# Patient Record
Sex: MALE | Race: WHITE | Employment: FULL TIME | URBAN - METROPOLITAN AREA
[De-identification: names, ages, dates, MRNs, and addresses within clinical notes are randomized per-mention and may not be internally consistent; named-entity substitution may affect disease eponyms.]

---

## 2018-02-06 ENCOUNTER — DOCUMENTATION ONLY (OUTPATIENT)
Dept: ORTHOPEDIC SURGERY | Age: 53
End: 2018-02-06

## 2018-02-06 ENCOUNTER — OFFICE VISIT (OUTPATIENT)
Dept: ORTHOPEDIC SURGERY | Age: 53
End: 2018-02-06

## 2018-02-06 VITALS
HEART RATE: 70 BPM | WEIGHT: 241.6 LBS | TEMPERATURE: 98.3 F | SYSTOLIC BLOOD PRESSURE: 157 MMHG | OXYGEN SATURATION: 94 % | RESPIRATION RATE: 19 BRPM | DIASTOLIC BLOOD PRESSURE: 90 MMHG | HEIGHT: 68 IN | BODY MASS INDEX: 36.62 KG/M2

## 2018-02-06 DIAGNOSIS — M51.26 PROTRUSION OF LUMBAR INTERVERTEBRAL DISC: Primary | ICD-10-CM

## 2018-02-06 PROBLEM — M48.062 SPINAL STENOSIS OF LUMBAR REGION WITH NEUROGENIC CLAUDICATION: Status: ACTIVE | Noted: 2018-02-06

## 2018-02-06 NOTE — PROGRESS NOTES
[address]  [address], [address]  [facility], [address]  Phone: [phone]  Fax: [phone]  INITIAL CONSULTATION  Patient: Sy Anglin                MRN: [id number]       SSN: [id number]  YOB: 1965        AGE: 47 y.o. SEX: male  Body mass index is 36.74 kg/(m^2). PCP: Nisha Badillo MD  02/06/18    Chief Complaint   Patient presents with    Back Pain     AHSAN          HISTORY OF PRESENT ILLNESS, RADIOGRAPHS, and PLAN:         HISTORY OF PRESENT ILLNESS:  Mr. Griffin De La Torre is seen today. He is accompanied by his wife and his . Mr. Griffin De La Torre is a 50-year-old male who presents himself as relatively healthy whose chief complaint currently is one of low back pain. His main issue is the pain is bilateral in his low back and centered at the superior aspect of his iliac crest bilaterally in his flank region. It is not in the midline. He denies regular leg pain. He gets an area of numbness in the left leg laterally to the knee from the hip joint to just below the knee on the left. He has had two episodes of right-sided leg weakness where he says the leg has gone out on him. He denies any other leg pain until he was actually in my waiting room leaving the office where he did say he had some grabbing pain in the top half of the right leg, which caused him to limp for a few minutes. He then recounted that he gets grabbing pains, which he describes as frogs, in both his right and left legs that he does not really recount because they are second nature to him that he gets bilaterally on occasion. He describes the back pain as between a 6/10 and an 8/10. It is most of the time. It is quite disconcerting and painful to him. This pain started in June 2016 after he injured himself on a treadmill while in [place]. It was a low-energy injury.   He has been through numerous treatments recounted in his medical records including extensive physical therapy, injections, medications, and attempted radio frequency ablation. He does state that he has some upper extremity symptoms but does not focus on them. He has put on weight since his injury, as he is less active. He says that standing, lying, and walking make his symptoms better. Changing positions, coughing, sneezing, bending forward, lifting, and sitting all worsen his symptoms. He does not smoke. He occasionally drinks beer. He is a  for [employer]. I reviewed his medical records and treatment and evaluations by Dr. Rui Turner and Dr. Randall Null, as well as an evaluation by Dr. Isadora Lefort. I also reviewed personally MRI and CT scan studies done of the patients lumbar spine. I also reviewed a CT discography done. The patient describes his pain as pervasive in his life. He is not terribly clear on his expectations from treatment but wants to be normal and playing golf. He describes himself as being a very high-functioning person prior to his injury, very physical, in the best shape of his life lifting high amounts of weight on a regular basis, doing all ranges of high level physical activities and now feels quite disabled because of this primarily axial back pain. He wants to be fixed and get better. PHYSICAL EXAMINATION:  His physical exam demonstrates normal strength in his lower extremities. There are no nerve tension signs. He has nonspecific numbness in the lateral aspect of his left leg, normal right leg. His main symptoms, mechanical symptoms, are entirely in his back. They are load intolerant. He is overweight. His weight is 241 pounds. He states he has put on significant weight since his injury. RADIOGRAPHS:  CT scan of his lumbar spine demonstrates degenerative changes at L4-5 and L5-S1 with disc space collapse, facet arthropathy, foraminal stenosis at L4-5 and L5-S1, and no evidence of instability. Degenerative changes in his discs are evident on discography. MRI recently done of his lumbar spine demonstrates a moderate sized, right paracentral disc herniation at L5-S1 causing lateral recess stenosis, a smaller, right, paracentral disc protrusion at L4-5, and mild degenerative changes at L4-5 causing mild central stenosis with an essentially age-normal lumbar spine at that segment. There is essentially minimal facet effusion. There is no evidence of instability or listhesis. EMGs done December 1, 2017, were abnormal studies, low-amplitude peroneal motor response, no acute denervation or radicular changes, and possible chronic L5-S1 radiculopathy. The patient has had medial branch blocks, which were of moderate effectiveness but ended up having RFA in his low lumbar segments that evidently were ineffective at addressing his pain. The patient has been through aggressive physical therapy. I reviewed an opinion of Dr. Pretty Will of September 20, 2017. Dr. Emely Canada, in his opinion, demonstrated degenerative disc disease at L4-5 and L5-S1 with three levels of painful discs at L3-4, L4-5, and L5-S1. He recommended conservative care with pain management with no surgery. At that time, in September 2017, the patients weight was 233, as opposed to his current 241. There does not appear to be any change in his physical exam at that time. Also, it was primarily back pain and occasional complaints of leg symptoms. I reviewed records from Dr. Kahlil Griffith. Dr. Asad Holliday describes in his report onset of radiculopathy, which I assume means leg pain, back into his legs, right greater than left at times and vice versa. So, variable back and leg pain.   In his opinion, the CT myelogram demonstrates truncation of the nerve roots at L3-4, disc extrusion at L5-S1 on the right, with triangulation of the thecal sac at L4-5 with moderate stenosis with a recommendation of a laminectomy at L3, L4, and L5.      ASSESSMENT/PLAN: We have a patient who has had a series of injuries while [place], one on a treadmill and another while lifting ammo boxes with resultant primarily back pain. His history of leg pain varies. Certainly, he was adamant during the bulk of our meeting that there was not much in the way of leg pain, though on leaving the office, he had some statements about leg symptoms. Certainly, the primary symptom the patient is having and was rather adamant while talking to him in my office, was one of back pain. That appears to have also been the primary complaint while visiting the initial treating physician, Dr. Yanely Major. The leg symptoms he gets are primarily numbness, and the symptoms vary from side to side. His EMGs are essentially benign without fixed radiculopathy. I am unimpressed by the global nature of his stenosis at any segment, and while the patient does have a right-sided disc herniation at L5-S1, he has no fixed numbness, weakness, or nerve tension signs on the right and is not suffering from right-sided sciatica. He has primarily back pain. Dr. Yanely Major initially evaluated and treated him for mechanical back pain. He did have relief from medial branch blocks but had no relief from RFA. The relief from medial branch blocks would insinuate that at least part of his pain is coming from his posterior elements, that is degenerative change, and injury to the facets at L4-5 and L5-S1. In looking at this CT scan of his lumbar spine, that is not surprising. The nature of his low back pain, its load-intolerance, his sitting intolerance, his problems with forward bending are also consistent with discogenic low back pain. While I do not believe in its validity as a diagnostic test and its predictive value in terms of surgery, the discogram that was done on him would indicate he has a measure of discogenic low back pain.   Dr. Yanely Major felt he was not a surgical candidate in as much as L3-4 did not provide us a pain-free control. Again, I am not one who believes in the validity of discography, so I would choose to ignore the use of it as a diagnostic test in either direction. I believe this patient does have a measure of discogenic low back pain, and that discogenic low back pain by appearance of his MRIs and his CT scan, and his CT discography would be emanating from also his L4-5 and L5-S1 segments. That is consistent with his symptomatology. As of now, there is no accurate diagnostic study to define discogenic low back pain. It is a diagnosis based on history and experience. That stated in-hand, I believe this patients pain is emanating from the two bottom lumbar levels. His pain is stemming from degenerative change coming from both the facets and discs at those two bottom lumbar levels. He has no gross instability. His pain is not coming from nerve root compression. To the best we can define it, his pain is coming from mechanical irritability of both the facet and of the disc at L4-5 and L5-S1. I believe our experience in spine surgery would show the only intervention that may help him with his pain would be a fusion of the L4-5 and L5-S1 segments. In my experience and, I believe, the literature of operating on discogenic low back pain, would show that the success rate of such a fusion type surgery would be in the neighborhood of 60%. That is approximately 6 out of 10 people who would undergo such a two-level lumbar fusion would feel it was successful for them. That being said, I do not believe a decompressive surgery of a three-level laminectomy is indicated. I do not believe this patient is having significant radicular pain. In fact, he does not describe a primary radicular pain syndrome to me, and I do not have a particular indication for doing a three-level laminectomy on him.   In addition, while I believe if one felt obligated to address his pain surgically, a two-level fusion is the surgery that would be indicated, I would not recommend surgery for this patient. I believe the success rate for surgery in his circumstance is poor. I do not believe his expectations are reasonable. I believe at the age of 47, at a weight of 240 pounds, the odds of him having a successful two-level lumbar fusion and achieving his expectations of functionality are minimal, and while we may be able to achieve a radiographic success, a truly functional success in meeting his expectations is poor, and I would strongly recommend against consideration of any type of surgery in his circumstance. I believe the best outcome for him would be achieved through pain management techniques. This could be either a consideration of various modes of medication management or either a more aggressive pain management technique such as spinal cord stimulation should his pain truly be felt to be beyond acceptance. My first goal would be to improve his physical fitness and try to review his recent weight gain and improve his overall level of fitness and core strength and flexibility. While a fusion surgery could be considered, I think it is unlikely to meet his overall expectation of functionality and outcome, and I would recommend against it. I do not see any role for decompressive procedures in this patients circumstance. Other than that, I would say his pain needs to be managed, and procedures and interventions whose goal it is to ameliorate his pain are to be considered reasonable goals. I hope this assists you in the evaluation of this patients circumstance. Please contact me should you have further questions or concerns. History reviewed. No pertinent past medical history.     Family History   Problem Relation Age of Onset    Cancer Mother     Cancer Father     Diabetes Father     Elevated Lipids Father     Parkinson's Disease Father     Diabetes Sister    Jenny Ingrid Bipolar Disorder Sister     Kidney Disease Sister     Other Sister      bursitis       Current Outpatient Prescriptions   Medication Sig Dispense Refill    meloxicam (MOBIC) 7.5 mg tablet Take 7.5 mg by mouth daily.  traMADol (ULTRAM) 50 mg tablet Take 50 mg by mouth every eight (8) hours as needed for Pain.  cyclobenzaprine (FLEXERIL) 10 mg tablet Take 10 mg by mouth three (3) times daily as needed for Muscle Spasm(s). No Known Allergies    Past Surgical History:   Procedure Laterality Date    HX APPENDECTOMY      HX CYST REMOVAL      cyst removal under chin    HX TONSILLECTOMY         History reviewed. No pertinent past medical history. Social History     Social History    Marital status:      Spouse name: N/A    Number of children: N/A    Years of education: N/A     Occupational History    Not on file. Social History Main Topics    Smoking status: Former Smoker     Quit date: 1/1/1996    Smokeless tobacco: Never Used    Alcohol use Yes      Comment: occassional    Drug use: No    Sexual activity: Yes     Other Topics Concern    Not on file     Social History Narrative    No narrative on file           REVIEW OF SYSTEMS:   CONSTITUTIONAL SYMPTOMS:  Negative. EYES:  Negative. EARS, NOSE, THROAT AND MOUTH:  Negative. CARDIOVASCULAR:  Negative. RESPIRATORY:  Negative. GENITOURINARY: Per HPI. GASTROINTESTINAL:  Per HPI. INTEGUMENTARY (SKIN AND/OR BREAST):  Negative. MUSCULOSKELETAL: Per HPI.   ENDOCRINE/RHEUMATOLOGIC:  Negative. NEUROLOGICAL:  Per HPI. HEMATOLOGIC/LYMPHATIC:  Negative. ALLERGIC/IMMUNOLOGIC:  Negative. PSYCHIATRIC:  Negative. PHYSICAL EXAMINATION:   Visit Vitals    /90    Pulse 70    Temp 98.3 °F (36.8 °C) (Oral)    Resp 19    Ht 5' 8\" (1.727 m)    Wt 241 lb 9.6 oz (109.6 kg)    SpO2 94%    BMI 36.74 kg/m2    PAIN SCALE: 6/10    CONSTITUTIONAL: The patient is in no apparent distress and is alert and oriented x 3. HEENT: Normocephalic. Hearing grossly intact. NECK: Supple and symmetric. no tenderness, or masses were felt. RESPIRATORY: No labored breathing. CARDIOVASCULAR: The carotid pulses were normal. Peripheral pulses were 2+. CHEST: Normal AP diameter and normal contour without any kyphoscoliosis. LYMPHATIC: No lymphadenopathy was appreciated in the neck, axillae or groin. SKIN:  Negative for scars, rashes, lesions, or ulcers on the right upper, right lower, left upper, left lower and trunk. NEUROLOGICAL: Alert and oriented x 3. Ambulation without assistive device. FWB. EXTREMITIES:  See musculoskeletal.  MUSCULOSKELETAL:   Head and Neck:  Negative for misalignment, asymmetry, crepitation, defects, tenderness masses or effusions.  Left Upper Extremity: Inspection, percussion and palpation preformed. Casillass sign is negative.  Right Upper Extremity: Inspection, percussion and palpation preformed. Casillass sign is negative.  Spine, Ribs and Pelvis: Antalgic ROM. Sharp pain in back, L iliac crest.  Inspection, percussion and palpation preformed. Negative for misalignment, asymmetry, crepitation, defects, tenderness masses or effusions.  Left Lower Extremity: Numbness lateral aspect. Inspection, percussion and palpation preformed. Negative straight leg raise.  Right Lower Extremity: Inspection, percussion and palpation preformed. Negative straight leg raise. SPINE EXAM:     Lumbar spine: No rash, ecchymosis, or gross obliquity. No focal atrophy is noted. ASSESSMENT    ICD-10-CM ICD-9-CM    1. Protrusion of lumbar intervertebral disc M51.26 722.10        Written by Monica Boland, as dictated by Supriya Gallegos MD.    I, Dr. Supriya Gallegos MD, confirm that all documentation is accurate.

## 2018-02-06 NOTE — PROGRESS NOTES
I CAME UPON PATIENT IN WAITING ROOM WITH NURSE  AND HIS WIFE.  THEY WERE TALKING AND I APPROACHED TO MAKE SURE EVERYTHING WAS OKAY. I WAS TOLD THAT IN HIS DISCUSSION WITH DR. Leyda Villareal HE WAS ASKED IF HE HAS ANY PAIN IN HIS LEG. HE HAD STATED THAT HE DOES NOT, BUT, WALKING OUT OF THE APPOINTMENT HE DID HAVE A GRABBING PAIN IN THE TOP HALF OF THE RT LEG THAT CAUSED HIM TO LIMP FOR A FEW MINUTES. THIS MADE HIM THINK ABOUT WHAT HE HAD TOLD DR. LORENZ. HE SAID HE GETS THESE GRABBING PAINS HE CALLED A \"FROG\" PERIODICALLY IN BOTH HIS LEFT AND RIGHT LEG. HE SAYS HE DID NOT THINK ABOUT IT BECAUSE IT IS LIKE SECOND NATURE TO HIM NOW. HIS NUMBNESS IS CONSTANT ON THE LEFT BUT THE GRABBING PAINS HAPPEN IN BOTH THE LEFT AND RIGHT. JUST A NOTE TO DR. LORENZ FROM THE PATIENT.   Ben Peres

## 2018-02-06 NOTE — PROGRESS NOTES
HISTORY OF PRESENT ILLNESS  Umberto Hansen is a 47 y.o. male.   HPI    ROS    Physical Exam    Ortho Exam    ASSESSMENT and PLAN  {ASSESSMENT/PLAN:[id number]}

## 2018-10-24 ENCOUNTER — DOCUMENTATION ONLY (OUTPATIENT)
Dept: ORTHOPEDIC SURGERY | Age: 53
End: 2018-10-24

## 2018-10-24 NOTE — PROGRESS NOTES
wc form was received from sathya, instructed patient that we will call when completed, may take 7-10 business days.

## 2021-08-26 ENCOUNTER — HOSPITAL ENCOUNTER (OUTPATIENT)
Dept: NUTRITION | Age: 56
Discharge: HOME | End: 2021-08-26
Payer: OTHER GOVERNMENT

## 2021-08-26 PROCEDURE — G0108 DIAB MANAGE TRN  PER INDIV: HCPCS

## 2021-08-26 NOTE — PROGRESS NOTES
[employer] [facility] Outpatient Diabetes Self-Management Education Program      DSME RECORD and PROGRESS NOTE         Patient: Gege Morton : 1965         Physician: Brenden Duran MD Date: 2021     VISIT No: [x]1 []2 []3 []4 []5 []6               [] Initial                   [] Subsequent Year ACTUAL hours furnished: PROGRESS NOTE    A1C: 6.6  Date A1C drawn: Aug 2021     [x] Initial     [] Updated    Height: 68  Weight: 208                Date: 21  BMI: 31.6     [x] Initial     [] Updated                   Weight Change:  [] Up    [] Down  lbs (Only for follow-up visit)     Diabetes Meds:  [x] None   [] List: (Provide list at initial visit -only list changes at follow-up visits)  [] No change       Other Meds:  [] BP   [] lipids          [] depression/anxiety        [] other:   Meloxicam prn  Triamcinolone Acetonide       Behavior Goals Set Related To:   (List at initial visit only and add any new goals, if any, from follow-up visits)  [x] Healthy eating      [] Being active  [] Taking meds        [x] Monitoring   [x] Reducing risks     [] Healthy coping  [x] Problem-solving  []  Other:    [] Separate Behavior Form Attached (use to assess progress on previously set goals)      [] Reviewed achievement of goals    Notes: Patient was recently diagnosed with DM this month after a short stay in the hospital for pancreatitis. Patient reports that he does have a family history of DM (father, sister). Patient reports that his diet before hospitalization was poor (fast-food and sweets). Patient's wife is present with him at visit today and very supportive. She does most grocery shopping and meal preparation.                     [x] Program schedule and visit dates                  [] 0.5     [x] 1    [] 1.5    [] 2     [] 2.5     [] 3    Format:   [x] Individual       [] Group    Also present:   [x]  Family: Wife     [] Friend:     [] Caregiver:        [] Other:                  [x] Behavior goals: [x] set goals  [] review achievement     [x] Prediabetes and Diabetes biology & diagnosis     [] BG goals and targets        [x] A1c test, meaning and goal     [x] Diabetes causes & risk factors        [x] Purpose of Healthy Eating & tips     [x] Body weight: [] healthy wt             [x] risks when overwt/obese     [x] Carbohydrate food sources & counting     [x] Lean proteins & healthy fats     [x] How foods/drinks affect BG     [] How to read a Food Label     [x] Healthy Plate Method     [x] Benefits of Being Active     [] Different types of exercise     [] Recommendations & safety issues for exercise     [] Diabetes identification (on body, in wallet/purse)     [] Obstacles to behavior change     [x] S.M.A.R.T.  Goals     [] Taking medications:      [] oral   []  injections   [] insulin     [] Understanding types of meds     [] Proper storage and handling of meds     [] OTC and alternative meds     [x] Purpose & benefits of Monitoring     [] Causes, prevention & treatment for hypo- & hyperglycemia     [x] General guidelines for monitoring     [] SMBG and monitoring schedule     [] BG logs       [] reviewed logs     [] Pattern management with SMBG log     [] BG meter receipt              Type/name of:                                    [] BG meter review     [] Reducing risks to prevent chronic complications       Monitor health status:             []  Eye exam    [] BP                        [x]  Lipids/heart disease            []  Nerve/Neuropathy             [] Foot care              []  Dental      [] Sleep                    []  Kidney      [] other:     [] Obstacles to behavior change      [x] Problem solving skills to overcome barriers and challenges     [] The process and tips for problem solving     [] Problem solving for hypo- & hyperglycemia     [] Sick Day Management     [x] Healthy Coping & stress management     [] DSM support plan      [x] Individual assessment     [x] Individual education plan and needs     [x]  Review initial assessment; correct knowledge deficits      []  Patient-selected topics:          []  Other topic(s):           Educator Signature: Tom Macario, 8/26/2021 11:53 AM

## 2022-01-05 ENCOUNTER — IMPORTED ENCOUNTER (OUTPATIENT)
Dept: URBAN - METROPOLITAN AREA CLINIC 1 | Facility: CLINIC | Age: 57
End: 2022-01-05

## 2022-01-05 PROBLEM — Z96.1: Noted: 2022-01-05

## 2022-01-05 PROBLEM — H35.371: Noted: 2022-01-05

## 2022-01-05 PROBLEM — H43.813: Noted: 2022-01-05

## 2022-01-05 PROBLEM — H43.811: Noted: 2022-01-05

## 2022-01-05 PROBLEM — R73.09: Noted: 2022-01-05

## 2022-01-05 PROCEDURE — 92004 COMPRE OPH EXAM NEW PT 1/>: CPT

## 2022-01-05 NOTE — PATIENT DISCUSSION
1.  DM Type II (Diet Controlled) without sign of diabetic retinopathy and no blot heme on dilated retinal examination today OU No Macular Edema:  Discussed the pathophysiology of diabetes and its effect on the eye and risk of blindness. Stressed the importance of strong glucose control. Advised of importance of at least yearly dilated examinations but to contact us immediately for any problems or concerns. 2. Pseudophakia OU (Toric MF) -- (VEC - 2019)  3. PVD w/o Tear OD -- RD precautions. 4.  Epiretinal Membrane OD -- Observe for change. Patient deferred Manifest Rx today. Return for an appointment in 1 year 27 with Dr. Sharmila Evangelista.

## 2022-01-05 NOTE — PATIENT DISCUSSION
H/o LASIK CHARY (VEC - 2019) Patient deferred Manifest Rx today. Return for an appointment in 1 year 27 with Dr. Maximino Schulz.

## 2022-04-02 ASSESSMENT — VISUAL ACUITY
OD_CC: 20/20
OS_CC: 20/20

## 2022-04-02 ASSESSMENT — TONOMETRY
OD_IOP_MMHG: 11
OS_IOP_MMHG: 11

## 2023-01-11 ENCOUNTER — COMPREHENSIVE EXAM (OUTPATIENT)
Dept: URBAN - METROPOLITAN AREA CLINIC 2 | Facility: CLINIC | Age: 58
End: 2023-01-11

## 2023-01-11 DIAGNOSIS — H43.813: ICD-10-CM

## 2023-01-11 DIAGNOSIS — H35.371: ICD-10-CM

## 2023-01-11 DIAGNOSIS — H26.491: ICD-10-CM

## 2023-01-11 DIAGNOSIS — H04.123: ICD-10-CM

## 2023-01-11 DIAGNOSIS — Z96.1: ICD-10-CM

## 2023-01-11 DIAGNOSIS — H16.143: ICD-10-CM

## 2023-01-11 PROCEDURE — 92014 COMPRE OPH EXAM EST PT 1/>: CPT

## 2023-01-11 ASSESSMENT — VISUAL ACUITY
OS_SC: 20/20
OD_SC: 20/20

## 2023-01-11 ASSESSMENT — TONOMETRY
OS_IOP_MMHG: 11
OD_IOP_MMHG: 11

## 2024-01-17 ENCOUNTER — COMPREHENSIVE EXAM (OUTPATIENT)
Dept: URBAN - METROPOLITAN AREA CLINIC 2 | Facility: CLINIC | Age: 59
End: 2024-01-17

## 2024-01-17 DIAGNOSIS — Z96.1: ICD-10-CM

## 2024-01-17 DIAGNOSIS — H26.493: ICD-10-CM

## 2024-01-17 DIAGNOSIS — H04.123: ICD-10-CM

## 2024-01-17 PROCEDURE — 92014 COMPRE OPH EXAM EST PT 1/>: CPT

## 2024-01-17 PROCEDURE — 92015 DETERMINE REFRACTIVE STATE: CPT

## 2024-01-17 ASSESSMENT — TONOMETRY
OD_IOP_MMHG: 11
OS_IOP_MMHG: 11

## 2024-01-17 ASSESSMENT — VISUAL ACUITY
OD_SC: 20/30
OD_BAT: 20/60
OS_BAT: 20/50
OS_SC: 20/20

## 2024-02-02 ENCOUNTER — CLINIC PROCEDURE ONLY (OUTPATIENT)
Dept: URBAN - METROPOLITAN AREA CLINIC 2 | Facility: CLINIC | Age: 59
End: 2024-02-02

## 2024-02-02 DIAGNOSIS — H26.491: ICD-10-CM

## 2024-02-02 PROCEDURE — 66821 AFTER CATARACT LASER SURGERY: CPT
